# Patient Record
Sex: FEMALE | Race: WHITE
[De-identification: names, ages, dates, MRNs, and addresses within clinical notes are randomized per-mention and may not be internally consistent; named-entity substitution may affect disease eponyms.]

---

## 2019-03-22 ENCOUNTER — HOSPITAL ENCOUNTER (OUTPATIENT)
Dept: HOSPITAL 88 - OR | Age: 74
Discharge: HOME | End: 2019-03-22
Attending: SURGERY
Payer: MEDICARE

## 2019-03-22 VITALS — SYSTOLIC BLOOD PRESSURE: 125 MMHG | DIASTOLIC BLOOD PRESSURE: 59 MMHG

## 2019-03-22 DIAGNOSIS — K81.1: Primary | ICD-10-CM

## 2019-03-22 DIAGNOSIS — Z01.818: ICD-10-CM

## 2019-03-22 DIAGNOSIS — Z91.041: ICD-10-CM

## 2019-03-22 DIAGNOSIS — K28.9: ICD-10-CM

## 2019-03-22 DIAGNOSIS — H40.9: ICD-10-CM

## 2019-03-22 DIAGNOSIS — K90.0: ICD-10-CM

## 2019-03-22 DIAGNOSIS — Z88.6: ICD-10-CM

## 2019-03-22 DIAGNOSIS — R00.1: ICD-10-CM

## 2019-03-22 DIAGNOSIS — F41.9: ICD-10-CM

## 2019-03-22 DIAGNOSIS — Z01.810: ICD-10-CM

## 2019-03-22 DIAGNOSIS — Z87.891: ICD-10-CM

## 2019-03-22 DIAGNOSIS — Z88.0: ICD-10-CM

## 2019-03-22 PROCEDURE — 93005 ELECTROCARDIOGRAM TRACING: CPT

## 2019-03-22 PROCEDURE — 47562 LAPAROSCOPIC CHOLECYSTECTOMY: CPT

## 2019-03-22 PROCEDURE — 88304 TISSUE EXAM BY PATHOLOGIST: CPT

## 2019-03-22 PROCEDURE — 71046 X-RAY EXAM CHEST 2 VIEWS: CPT

## 2019-03-22 NOTE — OPERATIVE REPORT
DATE OF PROCEDURE:  03/22/2019

 

SURGEON:  Kailash Frye MD

 

PREOPERATIVE DIAGNOSIS:  Biliary dyskinesia.

 

POSTOPERATIVE DIAGNOSIS:  Biliary dyskinesia.

 

OPERATION PERFORMED:  Laparoscopic cholecystectomy.

 

ASSISTANT:  Dr. Zay Frye and MONICA Perez.

 

ANESTHESIA:  General.

 

COMPLICATIONS:  None.

 

ESTIMATED BLOOD LOSS:  Minimal.

 

DESCRIPTION OF PROCEDURE:  With the patient lying in bed in the supine position under

good general endotracheal anesthesia, the abdomen was prepped with Betadine solution and

draped in the usual manner.  A Veress needle was introduced into the umbilicus and

pneumoperitoneum was established without any difficulty.  An 11 mm trocar was placed

into the umbilicus and a 10 mm video laparoscope was placed into the intra-abdominal

cavity.  Under direct vision, three 5 mm trocars were placed in the right subcostal

region.  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

have Phrygian cap as well as some changes of cholesterolosis.  The rest of the abdominal

exploration was otherwise within normal limits.  The peritoneum overlying the neck of

the gallbladder was then opened and the cystic duct was identified.  The cystic duct was

followed to its junction with the common duct.  The cystic duct was then

circumferentially dissected away from the common duct, doubly clipped and divided.  The

cystic artery was similarly doubly clipped and divided.  The gallbladder was then slowly

and carefully taken off the liver bed using the cautery scissors and perfect hemostasis

was ascertained.  The gallbladder was then grasped through the umbilical port and

removed without any difficulty.  Video laparoscopy was then again carried out.  The

liver bed was found to be perfectly dry.  All of the excess fluid was aspirated, the

pneumoperitoneum was evacuated and all the trocars were removed under direct vision.

The midline fascia at the umbilicus was then closed with a figure-of-eight of 0 Vicryl.

All layers were infiltrated on the way out with solution of 0.25% Marcaine.

Subcutaneous tissue was approximated with 3-0 Vicryl and the skin was closed with

subcuticular 5-0 Vicryl.  Benzoin, Steri-Strips, and Band-Aids were applied.  The

sponge, lap, and needle count was correct.  The patient tolerated the procedure well and

returned to the recovery room in stable condition. 

 

 

 

 

______________________________

MD VINCENT Tovar/JOSE ALFREDO

D:  03/22/2019 10:45:53

T:  03/22/2019 10:58:37

Job #:  [id number]

## 2019-03-22 NOTE — XMS REPORT
Patient Summary Document

                             Created on: 2019



CALLIE DUEÑAS

External Reference #: [id number]

: 1945

Sex: Female



Demographics







                          Address                   [address] [place]  [address]

 

                          Home Phone                [phone]

 

                          Preferred Language        Unknown

 

                          Marital Status            Unknown

 

                          Yazdanism Affiliation     Unknown

 

                          Race                      Unknown

 

                                        Additional Race(s)  

 

                          Ethnic Group              Unknown





Author







                          Author                    [facility]

 

                          [facility]

 

                          Address                   Unknown

 

                          Phone                     Unavailable







Support







                Name            Relationship    Address         Phone

 

                    ROCHELLE JIMÉNEZ     [place]  [address] [phone]

 

                    ROCHELLE JIMÉNEZ     [place]  [address] [phone]







Care Team Providers







                    Care Team Member Name    Role                Phone

 

                    ANNALISA MCRAE    Unavailable         Unavailable







Payers







             Payer Name    Policy Type    Policy Number    Effective Date    Expiration Date







Problems

This patient has no known problems.



Allergies, Adverse Reactions, Alerts







          Allergy Name    Allergy Type    Status    Severity    Reaction(s)    Onset Date    Inactive 

Date                      Treating Clinician        Comments

 

        CODEINE    DA      Active    U               2005 00:00:00                     







Medications

This patient has no known medications.



Results







           Test Description    Test Time    Test Comments    Text Results    Atomic Results    Result

 Comments

 

                CHEST 2 VIEWS    2019 16:00:00                                                             

                                             [place] [address]  
   Patient Name: CALLIE DUEÑAS                                   MR #: 
[id number]                     : 1945                                  
Age/Sex: 73/F  Acct #: [id number]                              Req #: [id number]
[id number]  Adm Physician:                                                      
Ordered by: KEN MCRAE MD                            Report #: [id number]
       Location: OR                                      Room/Bed:              
      
___________________________________________________________________________________________________
   Procedure: [id number] DX/CHEST 2 VIEWS  Exam Date: 19                   
        Exam Time: 1500                                              REPORT 
STATUS: Signed    EXAM: CHEST 2 VIEWS, PA and lateral   DATE: 3/18/2019  Time 
stamp on exam: 3:10 PM   INDICATION: Preoperative   COMPARISON: None      
FINDINGS:   LINES/TUBES: None      LUNGS: No consolidations or edema.       
PLEURA: No effusions or pneumothorax.      HEART AND MEDIASTINUM: Normal size 
and contour.      BONES AND SOFT TISSUES: Mild degenerative changes of the 
spine.       IMPRESSION:   No acute thoracic abnormality.                  
Signed by: Dr. Elizabeth Olsen DO on 3/18/2019 4:01 PM        Dictated By: ELIZABETH OLSEN DO  Electronically Signed By: ELIZABETH OLSEN DO on 19 1601  
Transcribed By: ALEN on 19 1601       COPY TO:   KEN MCRAE MD  
                                         

 

                - HEPA IMAG INCL GB W PHA    2019 13:31:00                                                 

                      [place]:    St: 
REG-------------------------------------------------------------------------------
 Name:   CALLIE DUEÑAS                [facility]                     
: 1945  Age/S: 73/F           [address] [facility]                Unit #: 
[id number]      Loc: [place]  [address]              Phys: Amanda Sung MD                                               Acct: 
[id number] Dis Date:               Status: REG CLI                            
   PHONE #: [phone]     Exam Date:     2019     1230                 
 FAX #: [phone]     Reason: R10.11,R14.0                                   
   EXAMS:                                               CPT CODE:      [id number]
HEPA IMAG INCL GB W PHA                    [id number]                    HISTORY: 
R10.11,R14.0               EXAM:  NUCLEAR MEDICINE HEPATOBILIARY SCAN WITH GB 
EJECTION FRACTION.               TECHNIQUE: After IV injection of 5.5 mCi Tc 99m
Choletec, sequential       planar images were obtained over the upper abdomen 
out to 60 minutes.       1.2 micrograms of IV CCK was then given with continued 
imaging for 30       minutes for calculation of gallbladder ejection fraction.  
            COMPARISON: Abdominal ultrasound 19               FINDINGS:  
Homogeneous distribution of radiopharmaceutical in the       liver. Normal 
accumulation of radiopharmaceutical in the gallbladder       by 30 minutes. No 
accumulation of radiopharmaceutical in small bowel       by 60 minutes.         
     After the IV administration of Kinevac, calculated gallbladder       
ejection fraction is 16%. Small bowel activity observed 6 minutes       after 
administration of CCK.                 IMPRESSION:                    1. No 
evidence of acute cholecystitis or biliary obstruction.          2. Abnormally 
depressed gallbladder ejection fraction. Delayed biliary         to bowel 
transit. Consider biliary dyskinesia or medication effect.         Correlate 
clinically.           ** Electronically Signed by Pippa Baugh D.O. on 2019 
at 9121 **                      Reported and signed by: Pippa Baugh D.O.          
   CC:                                                                          
                                                              Technologist: 
ELENITA GOODE                                         Trnscrd Date/Time/By: 
2019 (3020) : By: [email or web address]          Orig Print D/T: S: 2019 (2364)
                        PAGE  1                       Signed Report             
                                         

 

                CBC W/MANUAL DIFF    2019 21:53:00                      

 

   

 

                WHITE BLOOD CELL (test code=WBC)    6.8 K/mm3       4.5-12.5         

 

                RED BLOOD CELL (test code=RBC)    4.66 mill/mm3    3.7-5.2          

 

                HEMOGLOBIN (test code=HGB)    14.0 gram/dL    11.5-15.5        

 

                HEMATOCRIT (test code=HCT)    42.3 %          36.0-46.0        

 

                MEAN CELL VOLUME (test code=MCV)    90.8 fL         80-98            

 

                MEAN CELL HGB (test code=MCH)    30.0 picogram    27.0-33.0        

 

                MEAN CELL HGB CONCETRATION (test code=MCHC)    33.1 gram/dL    33.0-36.0        

 

                RED CELL DISTRIBUTION WIDTH (test code=RDW)    14.0 %          11.6-16.2        

 

                RED CELL DISTRIBUTION WIDTH SD (test code=RDW-SD)    45.7 fL         39.1-52.0        

 

                PLATELET COUNT (test code=PLT)    293 K/mm3       150-450          

 

                MEAN PLATELET VOLUME (test code=MPV)    11.3 fL         6.7-11.0         

 

                NEUTROPHIL % (test code=NT%)    45.9 %          39.0-69.0        

 

                LYMPHOCYTE % (test code=LY%)    43.0 %          25.0-55.0        

 

                MONOCYTE % (test code=MO%)    8.2 %           0.0-10.0         

 

                EOSINOPHIL % (test code=EO%)    2.2 %           0.0-5.0          

 

                BASOPHIL % (test code=BA%)    0.7 %           0.0-1.0          

 

                NEUTROPHIL # (test code=NT#)    3.12 K/mm3      1.8-7.7          

 

                LYMPHOCYTE # (test code=LY#)    2.93 K/mm3      1.0-5.0          

 

                MONOCYTE # (test code=MO#)    0.56 K/mm3      0-0.8            

 

                EOSINOPHIL # (test code=EO#)    0.15 K/mm3      0.0-0.5          

 

                BASOPHIL # (test code=BA#)    0.05 K/mm3      0.0-0.2          

 

                MANUAL DIFF REQUIRED (test code=MDIFF)    NO                               

 

                STAIN ACCEPTABILITY (test code=STN ACCEPTABLE)    STAIN ACCEPTABLE                     

 

                TOTAL CELLS COUNTED (test code=TCC)    100 #CELLS                       

 

                SEGMENTED NEUTROPHILS (test code=SEG)    51 %            39-69            

 

                LYMPHOCYTE (test code=LYMPH)    38 %            25-55            

 

                MONOCYTE (test code=MON)    8 %             0-10             

 

                EOSINOPHIL (test code=EOS)    3 %             0.0-5.0          

 

                DIFFERENTIAL COMMENT (test code=DC)    NORMAL                           

 

                POIKILOCYTOSIS (test code=POIK)    1+                               

 

                MAXIMILIAN CELLS (test code=MAXIMILIAN)    1+              NONE             

 

                MORPHOLOGY COMMENT (test code=MOC)    NORMAL                           

 

                PLATELET ESTIMATE (test code=PLTEST)    ADEQUATE                         

 

                PLATELET MORPHOLOGY (test code=PLTMORPH)    SIZE VARIABLE                     





CBC W/MANUAL [id number] 21:52:00* 





                Test Item       Value           Reference Range    Comments

 

                WHITE BLOOD CELL (test code=WBC)    6.8 K/mm3       4.5-12.5         

 

                RED BLOOD CELL (test code=RBC)    4.66 mill/mm3    3.7-5.2          

 

                HEMOGLOBIN (test code=HGB)    14.0 gram/dL    11.5-15.5        

 

                HEMATOCRIT (test code=HCT)    42.3 %          36.0-46.0        

 

                MEAN CELL VOLUME (test code=MCV)    90.8 fL         80-98            

 

                MEAN CELL HGB (test code=MCH)    30.0 picogram    27.0-33.0        

 

                MEAN CELL HGB CONCETRATION (test code=MCHC)    33.1 gram/dL    33.0-36.0        

 

                RED CELL DISTRIBUTION WIDTH (test code=RDW)    14.0 %          11.6-16.2        

 

                RED CELL DISTRIBUTION WIDTH SD (test code=RDW-SD)    45.7 fL         39.1-52.0        

 

                PLATELET COUNT (test code=PLT)    293 K/mm3       150-450          

 

                MEAN PLATELET VOLUME (test code=MPV)    11.3 fL         6.7-11.0         

 

                NEUTROPHIL % (test code=NT%)    45.9 %          39.0-69.0        

 

                LYMPHOCYTE % (test code=LY%)    43.0 %          25.0-55.0        

 

                MONOCYTE % (test code=MO%)    8.2 %           0.0-10.0         

 

                EOSINOPHIL % (test code=EO%)    2.2 %           0.0-5.0          

 

                BASOPHIL % (test code=BA%)    0.7 %           0.0-1.0          

 

                NEUTROPHIL # (test code=NT#)    3.12 K/mm3      1.8-7.7          

 

                LYMPHOCYTE # (test code=LY#)    2.93 K/mm3      1.0-5.0          

 

                MONOCYTE # (test code=MO#)    0.56 K/mm3      0-0.8            

 

                EOSINOPHIL # (test code=EO#)    0.15 K/mm3      0.0-0.5          

 

                BASOPHIL # (test code=BA#)    0.05 K/mm3      0.0-0.2          

 

                MANUAL DIFF REQUIRED (test code=MDIFF)    NO                               

 

                STAIN ACCEPTABILITY (test code=STN ACCEPTABLE)    STAIN ACCEPTABLE                     

 

                TOTAL CELLS COUNTED (test code=TCC)     #CELLS                          

 

                SEGMENTED NEUTROPHILS (test code=SEG)    51 %            39-69            

 

                LYMPHOCYTE (test code=LYMPH)    38 %            25-55            

 

                MONOCYTE (test code=MON)    8 %             0-10             

 

                EOSINOPHIL (test code=EOS)    3 %             0.0-5.0          

 

                DIFFERENTIAL COMMENT (test code=DC)    NORMAL                           

 

                POIKILOCYTOSIS (test code=POIK)    1+                               

 

                MAXIMILIAN CELLS (test code=MAXIMILIAN)    1+              NONE             

 

                MORPHOLOGY COMMENT (test code=MOC)                                     

 

                PLATELET ESTIMATE (test code=PLTEST)    ADEQUATE                         

 

                PLATELET MORPHOLOGY (test code=PLTMORPH)    SIZE VARIABLE                     





CBC W/MANUAL [id number] 21:52:00* 





                Test Item       Value           Reference Range    Comments

 

                WHITE BLOOD CELL (test code=WBC)    6.8 K/mm3       4.5-12.5         

 

                RED BLOOD CELL (test code=RBC)    4.66 mill/mm3    3.7-5.2          

 

                HEMOGLOBIN (test code=HGB)    14.0 gram/dL    11.5-15.5        

 

                HEMATOCRIT (test code=HCT)    42.3 %          36.0-46.0        

 

                MEAN CELL VOLUME (test code=MCV)    90.8 fL         80-98            

 

                MEAN CELL HGB (test code=MCH)    30.0 picogram    27.0-33.0        

 

                MEAN CELL HGB CONCETRATION (test code=MCHC)    33.1 gram/dL    33.0-36.0        

 

                RED CELL DISTRIBUTION WIDTH (test code=RDW)    14.0 %          11.6-16.2        

 

                RED CELL DISTRIBUTION WIDTH SD (test code=RDW-SD)    45.7 fL         39.1-52.0        

 

                PLATELET COUNT (test code=PLT)    293 K/mm3       150-450          

 

                MEAN PLATELET VOLUME (test code=MPV)    11.3 fL         6.7-11.0         

 

                NEUTROPHIL % (test code=NT%)    45.9 %          39.0-69.0        

 

                LYMPHOCYTE % (test code=LY%)    43.0 %          25.0-55.0        

 

                MONOCYTE % (test code=MO%)    8.2 %           0.0-10.0         

 

                EOSINOPHIL % (test code=EO%)    2.2 %           0.0-5.0          

 

                BASOPHIL % (test code=BA%)    0.7 %           0.0-1.0          

 

                NEUTROPHIL # (test code=NT#)    3.12 K/mm3      1.8-7.7          

 

                LYMPHOCYTE # (test code=LY#)    2.93 K/mm3      1.0-5.0          

 

                MONOCYTE # (test code=MO#)    0.56 K/mm3      0-0.8            

 

                EOSINOPHIL # (test code=EO#)    0.15 K/mm3      0.0-0.5          

 

                BASOPHIL # (test code=BA#)    0.05 K/mm3      0.0-0.2          

 

                MANUAL DIFF REQUIRED (test code=MDIFF)    NO                               

 

                STAIN ACCEPTABILITY (test code=STN ACCEPTABLE)    STAIN ACCEPTABLE                     

 

                TOTAL CELLS COUNTED (test code=TCC)    100 #CELLS                       

 

                SEGMENTED NEUTROPHILS (test code=SEG)    51 %            39-69            

 

                LYMPHOCYTE (test code=LYMPH)    38 %            25-55            

 

                MONOCYTE (test code=MON)    8 %             0-10             

 

                EOSINOPHIL (test code=EOS)    3 %             0.0-5.0          

 

                DIFFERENTIAL COMMENT (test code=DC)    NORMAL                           

 

                POIKILOCYTOSIS (test code=POIK)    1+                               

 

                MAXIMILIAN CELLS (test code=MAXIMILIAN)    1+              NONE             

 

                MORPHOLOGY COMMENT (test code=MOC)                                     

 

                PLATELET ESTIMATE (test code=PLTEST)    ADEQUATE                         

 

                PLATELET MORPHOLOGY (test code=PLTMORPH)    SIZE VARIABLE                     





COMPREHENSIVE METABOLIC [id number] 20:39:00* 





                Test Item       Value           Reference Range    Comments

 

                SODIUM (test code=NA)    141 mmol/L      135-148          

 

                POTASSIUM (test code=K)    4.0 mmol/L      3.5-5.1          

 

                CHLORIDE (test code=CL)    104 mmol/L      101-109          

 

                CARBON DIOXIDE (test code=CO2)    28.7 mmol/L     21-32            

 

                ANION GAP (test code=GAP)    12 mmol/L       10-20            

 

                GLUCOSE (test code=GLU)    84 mg/dL                   

 

                BLOOD UREA NITROGEN (test code=BUN)    17 mg/dL        3-21             

 

                CREATININE (test code=CREAT)    0.73 mg/dL      0.55-1.3         

 

                BUN/CREATININE RATIO (test code=BUN/CREA)    23.3            10-20            

 

                TOTAL PROTEIN (test code=PROT)    7.8 g/dL        6.5-8.4          

 

                ALBUMIN (test code=ALB)    4.0 g/dL        3.4-4.8          

 

                GLOBULIN (test code=GLOB)    3.8 G/DL        1-10             

 

                ALBUMIN/GLOBULIN RATIO (test code=A/G)    1.1 RATIO       0.75-1.50        

 

                CALCIUM (test code=CA)    8.9 mg/dL       8.4-10.2         

 

                BILIRUBIN TOTAL (test code=BILT)    0.50 mg/dL      0.0-1.0          

 

                SGOT/AST (test code=AST)    20 U/L          6-32             

 

                SGPT/ALT (test code=ALT)    27 U/L          12-78           Note: Change in REFERENCE RANGE due to 

new reagent method.

 

                ALKALINE PHOSPHATASE TOTAL (test code=ALKP)    81 U/L                     





CBC W/MANUAL [id number] 20:17:00* 





                Test Item       Value           Reference Range    Comments

 

                WHITE BLOOD CELL (test code=WBC)    6.8 K/mm3       4.5-12.5         

 

                RED BLOOD CELL (test code=RBC)    4.66 mill/mm3    3.7-5.2          

 

                HEMOGLOBIN (test code=HGB)    14.0 gram/dL    11.5-15.5        

 

                HEMATOCRIT (test code=HCT)    42.3 %          36.0-46.0        

 

                MEAN CELL VOLUME (test code=MCV)    90.8 fL         80-98            

 

                MEAN CELL HGB (test code=MCH)    30.0 picogram    27.0-33.0        

 

                MEAN CELL HGB CONCETRATION (test code=MCHC)    33.1 gram/dL    33.0-36.0        

 

                RED CELL DISTRIBUTION WIDTH (test code=RDW)    14.0 %          11.6-16.2        

 

                RED CELL DISTRIBUTION WIDTH SD (test code=RDW-SD)    45.7 fL         39.1-52.0        

 

                PLATELET COUNT (test code=PLT)    293 K/mm3       150-450          

 

                MEAN PLATELET VOLUME (test code=MPV)    11.3 fL         6.7-11.0         

 

                NEUTROPHIL % (test code=NT%)    45.9 %          39.0-69.0        

 

                LYMPHOCYTE % (test code=LY%)    43.0 %          25.0-55.0        

 

                MONOCYTE % (test code=MO%)    8.2 %           0.0-10.0         

 

                EOSINOPHIL % (test code=EO%)    2.2 %           0.0-5.0          

 

                BASOPHIL % (test code=BA%)    0.7 %           0.0-1.0          

 

                NEUTROPHIL # (test code=NT#)    3.12 K/mm3      1.8-7.7          

 

                LYMPHOCYTE # (test code=LY#)    2.93 K/mm3      1.0-5.0          

 

                MONOCYTE # (test code=MO#)    0.56 K/mm3      0-0.8            

 

                EOSINOPHIL # (test code=EO#)    0.15 K/mm3      0.0-0.5          

 

                BASOPHIL # (test code=BA#)    0.05 K/mm3      0.0-0.2          

 

                MANUAL DIFF REQUIRED (test code=MDIFF)    NO                               

 

                STAIN ACCEPTABILITY (test code=STN ACCEPTABLE)                                     

 

                TOTAL CELLS COUNTED (test code=TCC)     #CELLS                          

 

                SEGMENTED NEUTROPHILS (test code=SEG)     %              39-69            

 

                LYMPHOCYTE (test code=LYMPH)     %              25-55            

 

                MONOCYTE (test code=MON)     %              0-10             

 

                MORPHOLOGY COMMENT (test code=MOC)                                     

 

                PLATELET ESTIMATE (test code=PLTEST)                                     

 

                PLATELET MORPHOLOGY (test code=PLTMORPH)                                     





- US ABDOMEN [id number] 18:57:00  Name: CALLIE DUEÑAS               
 [facility]     : 1945 Age/S: 73  / F         [address] 
[facility]           Unit #: [id number]     Loc:               [place] [address]                Phys: Amanda Sung MD                             
                 Acct: [id number]  Dis Date:               Status: REG CLI    
                            PHONE #: [phone]     Exam Date: 2019                     FAX #: [phone]      Reason: RUQ PAIN              
                             EXAMS:                                             
 CPT CODE:      [id number] US ABDOMEN COMPLETE                        [id number]      
                     REASON FOR EXAM: RUQ PAIN               EXAM ORDER DATE: 
2019 5:49 PM               Attending MEmilianoD.: Amanda Leon MD           
   PROCEDURE:  - US ABDOMEN COMPLETE               Technique: Grayscale images 
of the Pancreas, liver, bilateral kidneys,       common bile duct, spleen, and 
gallbladder.       Grayscale and color Doppler images of the aorta, IVC, and 
portal vein.               Comparison study:None               FINDINGS:        
       Aorta and IVC:Patent and grossly normal in caliber.               Liver: 
Parenchyma echogenicity is slightly increased. Liver contour is       smooth. 
Size is within normal limits.  No focal mass or lesion.               Portal 
vein: Portal vein caliber is within normal limits.  Portal vein       is patent 
with hepatopedal flow.               Pancreas: Incompletely visualized. However 
the visualized portions are       grossly within normal limits.               
Right kidney: 10.1 x 4.3 x 5.3 cm. No evidence of hydronephrosis. No       
evidence of nephrolithiasis. No sonographically apparent renal mass.            
  Left kidney: 10.0 x 4.9 x 4.3 cm. No evidence of hydronephrosis. No       
evidence of nephrolithiasis. No sonographically apparent renal mass.            
  Spleen: 6.9 x 2.4 x 2.5 centimeters.  Parenchyma is sonographically       
unremarkable.               The gallbladder is unremarkable and the sonographic 
Gregory sign is       negative. The common bile duct measures 0.4 cm             
  There is no evidence of ascites.                 IMPRESSION:                  
 Slightly increased echogenicity of the hepatic parenchyma may reflect     PAGE 
1                       Signed Report                    (CONTINUED)   Name: 
CALLIE DUEÑAS                 [facility]     : 
1945 Age/S: 73  / F         [address] [facility]           Unit #: 
[id number]     Loc:               [place] [address]                Phys: Amanda Sung MD                                               Acct: 
[id number]  Dis Date:               Status: REG CLI                                
PHONE #: [phone]     Exam Date: 2019                     FAX #:
[phone]      Reason: RUQ PAIN                                            
EXAMS:                                               CPT CODE:      [id number] US
ABDOMEN COMPLETE                        [id number]               <Continued>         
mild steatosis. Otherwise unremarkable ultrasound of the abdomen.          ** 
Electronically Signed by Adrian Gar MD on 2019 at 1857 **                
     Reported and signed by: Adrian Gar MD                                     
CC:                                                                             
                                                           Technologist: Shameka Yu RDMS                                       [facility] Date/Time: 2019 
(495) [id number]                       Orig Print D/T: S: 2019 (0010)    
Probe:                       PAGE  2                       Signed Report

## 2019-03-22 NOTE — XMS REPORT
Clinical Summary

                             Created on: 2019



Oseguera, Marcella

External Reference #: [id number]

: 1945

Sex: Female



Demographics







                          Address                   [address] [place]  [address]

 

                          Home Phone                [phone]

 

                          Preferred Language        English

 

                          Marital Status            Single

 

                          Congregation Affiliation     Unknown

 

                          Race                      White

 

                          Ethnic Group              Non-





Author







                          Author                    [facility]

 

                          Organization              [facility]

 

                          Address                   Unknown

 

                          Phone                     Unavailable







Support







                Name            Relationship    Address         Phone

 

                Mimi Tse    [employer]            Unknown         [phone]







Care Team Providers







                    Care Team Member Name    Role                Phone

 

                    Amanda Harper MD    PCP                 [phone]







Allergies







                                        Comments



                 Active Allergy     Reactions       Severity        Noted Date 

 

                                         



                           Aspirin                   2017 

 

                                         



                     Codeine             GI                  2017 



                                         Intolerance   

 

                                         



                 Penicillins     Rash            Low             2017 







Medications







                          End Date                  Status



              Medication     Sig          Dispensed     Refills      Start  



                                         Date  

 

                                                    Active



                     travoprost (TRAVATAN-Z)     1 drop              0   



                           0.004 %                   nightly.     







Active Problems





No known active problems



Family History







   



                 Medical History     Relation        Name            Comments

 

   



                           Heart disease             Father  

 

   



                           Diabetes                  Maternal  



                                         Grandmother  

 

   



                           Heart disease             Mother  

 

   



                           Hypertension              Mother  

 

   



                           Stroke                    Mother  









   



                 Relation        Name            Status          Comments

 

   



                                         Father   

 

   



                                         Maternal Grandmother   

 

   



                                         Mother   







Social History







                                        Date



                 Tobacco Use     Types           Packs/Day       Years Used 

 

                                         



                                         Current Every Day Smoker    

 

    



                                         Smokeless Tobacco:   



                                         Current User   









   



                 Alcohol Use     Drinks/Week     oz/Week         Comments

 

   



                                         Yes   









 



                           Sex Assigned at Birth     Date Recorded

 

 



                                         Not on file 









                                        Industry



                           Job Start Date            Occupation 

 

                                        Not on file



                           Not on file               Not on file 









                                        Travel End



                           Travel History            Travel Start 

 





                                         No recent travel history available.







Last Filed Vital Signs

Not on file



Plan of Treatment







   



                 Health Maintenance     Due Date        Last Done       Comments

 

   



                           BREAST CANCER SCREENING     1997  

 

   



                           COLON CANCER SCREENING     1997  

 

   



                           SHINGLES VACCINES (#1)     1997  

 

   



                           65+ PNEUMOCOCCAL VACCINE     2012  



                                         (1 of 2 - PCV13)   

 

   



                           PNEUMOCOCCAL              2012  



                                         POLYSACCHARIDE VACCINE   



                                         AGE 65 AND OVER   

 

   



                           INFLUENZA VACCINE         2018  







Results

Not on fileafter 2018



Insurance







     



            Payer      Benefit     Subscriber ID     Type       Phone      Address



                                         Plan /    



                                         Group    

 

     



                 [employer]ARE     AETNA           xxxxxxxx        HMO  



                                         MEDICARE    



                                         HMO/PPO    



                                         [facility]    









     



            Guarantor Name     Account     Relation to     Date of     Phone      Billing Address



                     Type                Patient             Birth  

 

     



            Marcella Oseguera     Personal/F     Self       1945     [phone]     [address]



                     amily               (Home)              [place] [address]







Advance Directives





Patient has advance care planning documents on file. For more information, biju tello contact:



Miguelangel Oropeza



[address] [place] [address]

## 2021-05-11 ENCOUNTER — APPOINTMENT (RX ONLY)
Dept: URBAN - METROPOLITAN AREA CLINIC 120 | Facility: CLINIC | Age: 76
Setting detail: DERMATOLOGY
End: 2021-05-11

## 2021-05-11 DIAGNOSIS — L40.8 OTHER PSORIASIS: ICD-10-CM

## 2021-05-11 PROCEDURE — ? TREATMENT REGIMEN

## 2021-05-11 PROCEDURE — 99203 OFFICE O/P NEW LOW 30 MIN: CPT

## 2021-05-11 ASSESSMENT — LOCATION SIMPLE DESCRIPTION DERM
LOCATION SIMPLE: LEFT HAND
LOCATION SIMPLE: GENITALIA

## 2021-05-11 ASSESSMENT — LOCATION DETAILED DESCRIPTION DERM
LOCATION DETAILED: GENITALIA
LOCATION DETAILED: LEFT RADIAL PALM

## 2021-05-11 ASSESSMENT — LOCATION ZONE DERM
LOCATION ZONE: HAND
LOCATION ZONE: TRUNK

## 2021-05-11 NOTE — PROCEDURE: TREATMENT REGIMEN
Detail Level: Simple
Initiate Treatment: Halobetasol ointment qd- bid written Rx given
Otc Regimen:  or Elta tar ointment qhs to hands\\nok to continue hemp cream

## 2025-08-14 ENCOUNTER — APPOINTMENT (OUTPATIENT)
Dept: URBAN - METROPOLITAN AREA CLINIC 120 | Facility: CLINIC | Age: 80
Setting detail: DERMATOLOGY
End: 2025-08-14

## 2025-08-14 DIAGNOSIS — L81.0 POSTINFLAMMATORY HYPERPIGMENTATION: ICD-10-CM

## 2025-08-14 DIAGNOSIS — L259 CONTACT DERMATITIS AND OTHER ECZEMA, UNSPECIFIED CAUSE: ICD-10-CM | Status: IMPROVED

## 2025-08-14 PROBLEM — L23.9 ALLERGIC CONTACT DERMATITIS, UNSPECIFIED CAUSE: Status: ACTIVE | Noted: 2025-08-14

## 2025-08-14 PROCEDURE — ? COUNSELING
